# Patient Record
(demographics unavailable — no encounter records)

---

## 2025-01-13 NOTE — PHYSICAL EXAM
[Normal Mood and Affect] : normal mood and affect [Able to Communicate] : able to communicate [Well Developed] : well developed [Well Nourished] : well nourished [2nd] : 2nd [3rd] : 3rd [4th] : 4th [Metacarpal] : metacarpal [MCP Joint] : MCP joint [NL (75)] : Dorsiflexion 75 degrees [NL (85)] : volarflexion 85 degrees [NL (90)] : supination 90 degrees [5___] : volarflexion 5[unfilled]/5 [Left] : left hand [There are no fractures, subluxations or dislocations. No significant abnormalities are seen] : There are no fractures, subluxations or dislocations. No significant abnormalities are seen [] : no wrist swelling [FreeTextEntry9] : pain with resistance to finger extension

## 2025-01-13 NOTE — PLAN
[TextEntry] : The patient was advised of the diagnosis. The natural history of the pathology was explained in full to the patient in layman's terms. All questions were answered. The risks and benefits of surgical and non-surgical treatment alternatives were explained in full to the patient.  The patient was instructed on the importance of ice and elevation of the extremity to decrease swelling and pain.  Referred fro wrist cockup splint to wear x next 2-3 weeks.  Continue rom all fingers as tolerated.  Pt has allergy to ASA and cannot take nsaids.

## 2025-01-13 NOTE — HISTORY OF PRESENT ILLNESS
[Sudden] : sudden [2] : 2 [Sharp] : sharp [Constant] : constant [Full time] : Work status: full time [de-identified] : 1/1/3/25  Initial visit for this 64 year old male RHD who injured his lt hand kayaking x 8 days ago. C/o acute pain and swelling. Went to  in [place] where xrays were negative for a fx. D/c'd with a sling.Still c/o pain and swelling. Denies N/T. [] : no [FreeTextEntry1] : left hand [FreeTextEntry9] : no treatment [de-identified] : grasping  [de-identified] : 1/6/25 [de-identified] : x ray [de-identified] : [facility]